# Patient Record
Sex: MALE | URBAN - METROPOLITAN AREA
[De-identification: names, ages, dates, MRNs, and addresses within clinical notes are randomized per-mention and may not be internally consistent; named-entity substitution may affect disease eponyms.]

---

## 2022-05-10 ENCOUNTER — OFFICE VISIT (OUTPATIENT)
Dept: URBAN - METROPOLITAN AREA CLINIC 51 | Facility: CLINIC | Age: 72
End: 2022-05-10
Payer: COMMERCIAL

## 2022-05-10 DIAGNOSIS — H35.363 DRUSEN (DEGENERATIVE) OF MACULA, BILATERAL: Primary | ICD-10-CM

## 2022-05-10 DIAGNOSIS — H18.413 ARCUS SENILIS, BILATERAL: ICD-10-CM

## 2022-05-10 DIAGNOSIS — H43.313 VITREOUS MEMBRANES AND STRANDS, BILATERAL: ICD-10-CM

## 2022-05-10 DIAGNOSIS — H52.4 PRESBYOPIA: ICD-10-CM

## 2022-05-10 DIAGNOSIS — H25.13 AGE-RELATED NUCLEAR CATARACT, BILATERAL: ICD-10-CM

## 2022-05-10 PROCEDURE — 92134 CPTRZ OPH DX IMG PST SGM RTA: CPT | Performed by: OPTOMETRIST

## 2022-05-10 PROCEDURE — 92004 COMPRE OPH EXAM NEW PT 1/>: CPT | Performed by: OPTOMETRIST

## 2022-05-10 PROCEDURE — 92133 CPTRZD OPH DX IMG PST SGM ON: CPT | Performed by: OPTOMETRIST

## 2022-05-10 ASSESSMENT — INTRAOCULAR PRESSURE
OD: 18
OS: 16

## 2022-05-10 ASSESSMENT — VISUAL ACUITY
OD: 20/20
OS: 20/20

## 2022-05-10 ASSESSMENT — KERATOMETRY
OD: 45.37
OS: 45.68

## 2022-05-10 NOTE — IMPRESSION/PLAN
Impression: Presbyopia: H52.4. Plan: Patient declined new SRx and wishes to continue with OTC readers. Discussed the option of trialing Vuity eye drops. Risks and benefits discussed, patient declined. Can send ERx for Vuity drops to patients preferred pharmacy in the future, if patient wishes.

## 2022-05-10 NOTE — IMPRESSION/PLAN
Impression: Drusen (degenerative) of macula, bilateral: H35.363. *Denies family history Plan: Educated patient on condition and findings. Drusen/ RPE changes, no SRF OU. MAC OCT taken. Does not meet AREDS criteria at this time, but discussed. RTC with any changes/worsening in vision. Monitor with MOCT/FUNDUS PHOTOS.

## 2022-05-10 NOTE — IMPRESSION/PLAN
Impression: Age-related nuclear cataract, bilateral: H25.13. Plan: Educated patient on findings. Cataracts are not visually significant or effecting ADLs/vision at this time. No treatment is currently warranted. Patient will monitor vision closely and contact our office with any changes/ worsening in vision. Monitor.